# Patient Record
Sex: MALE | URBAN - METROPOLITAN AREA
[De-identification: names, ages, dates, MRNs, and addresses within clinical notes are randomized per-mention and may not be internally consistent; named-entity substitution may affect disease eponyms.]

---

## 2018-12-21 ENCOUNTER — TRANSFERRED RECORDS (OUTPATIENT)
Dept: HEALTH INFORMATION MANAGEMENT | Facility: CLINIC | Age: 73
End: 2018-12-21

## 2018-12-22 ENCOUNTER — HOSPITAL ENCOUNTER (INPATIENT)
Facility: CLINIC | Age: 73
LOS: 3 days | Discharge: NURSING, REHAB OR LONG-TERM CARE | DRG: 381 | End: 2018-12-25
Attending: INTERNAL MEDICINE | Admitting: INTERNAL MEDICINE
Payer: MEDICARE

## 2018-12-22 DIAGNOSIS — K92.0 HEMATEMESIS, PRESENCE OF NAUSEA NOT SPECIFIED: ICD-10-CM

## 2018-12-22 DIAGNOSIS — I48.0 PAROXYSMAL ATRIAL FIBRILLATION (H): Primary | ICD-10-CM

## 2018-12-22 LAB
ABO + RH BLD: NORMAL
ABO + RH BLD: NORMAL
ALBUMIN SERPL-MCNC: 3 G/DL (ref 3.4–5)
ALP SERPL-CCNC: 59 U/L (ref 40–150)
ALT SERPL W P-5'-P-CCNC: 17 U/L (ref 0–70)
ANION GAP SERPL CALCULATED.3IONS-SCNC: 7 MMOL/L (ref 3–14)
APTT PPP: 30 SEC (ref 22–37)
AST SERPL W P-5'-P-CCNC: 15 U/L (ref 0–45)
BASOPHILS # BLD AUTO: 0 10E9/L (ref 0–0.2)
BASOPHILS NFR BLD AUTO: 0.1 %
BILIRUB SERPL-MCNC: 0.6 MG/DL (ref 0.2–1.3)
BLD GP AB SCN SERPL QL: NORMAL
BLOOD BANK CMNT PATIENT-IMP: NORMAL
BUN SERPL-MCNC: 18 MG/DL (ref 7–30)
CALCIUM SERPL-MCNC: 7.6 MG/DL (ref 8.5–10.1)
CHLORIDE SERPL-SCNC: 110 MMOL/L (ref 94–109)
CO2 SERPL-SCNC: 24 MMOL/L (ref 20–32)
CREAT SERPL-MCNC: 0.78 MG/DL (ref 0.66–1.25)
DIFFERENTIAL METHOD BLD: ABNORMAL
EOSINOPHIL # BLD AUTO: 0 10E9/L (ref 0–0.7)
EOSINOPHIL NFR BLD AUTO: 0.1 %
ERYTHROCYTE [DISTWIDTH] IN BLOOD BY AUTOMATED COUNT: 12.9 % (ref 10–15)
GFR SERPL CREATININE-BSD FRML MDRD: >90 ML/MIN/{1.73_M2}
GLUCOSE SERPL-MCNC: 90 MG/DL (ref 70–99)
HCT VFR BLD AUTO: 39.8 % (ref 40–53)
HGB BLD-MCNC: 11.9 G/DL (ref 13.3–17.7)
HGB BLD-MCNC: 12.9 G/DL (ref 13.3–17.7)
IMM GRANULOCYTES # BLD: 0 10E9/L (ref 0–0.4)
IMM GRANULOCYTES NFR BLD: 0.2 %
INR PPP: 1.15 (ref 0.86–1.14)
LACTATE BLD-SCNC: 1.1 MMOL/L (ref 0.7–2)
LYMPHOCYTES # BLD AUTO: 0.8 10E9/L (ref 0.8–5.3)
LYMPHOCYTES NFR BLD AUTO: 8.7 %
MCH RBC QN AUTO: 30.8 PG (ref 26.5–33)
MCHC RBC AUTO-ENTMCNC: 32.4 G/DL (ref 31.5–36.5)
MCV RBC AUTO: 95 FL (ref 78–100)
MONOCYTES # BLD AUTO: 1 10E9/L (ref 0–1.3)
MONOCYTES NFR BLD AUTO: 10.4 %
NEUTROPHILS # BLD AUTO: 7.6 10E9/L (ref 1.6–8.3)
NEUTROPHILS NFR BLD AUTO: 80.5 %
NRBC # BLD AUTO: 0 10*3/UL
NRBC BLD AUTO-RTO: 0 /100
PLATELET # BLD AUTO: 135 10E9/L (ref 150–450)
POTASSIUM SERPL-SCNC: 4.2 MMOL/L (ref 3.4–5.3)
PROT SERPL-MCNC: 6 G/DL (ref 6.8–8.8)
RBC # BLD AUTO: 4.19 10E12/L (ref 4.4–5.9)
SODIUM SERPL-SCNC: 140 MMOL/L (ref 133–144)
SPECIMEN EXP DATE BLD: NORMAL
UPPER GI ENDOSCOPY: NORMAL
WBC # BLD AUTO: 9.4 10E9/L (ref 4–11)

## 2018-12-22 PROCEDURE — 86901 BLOOD TYPING SEROLOGIC RH(D): CPT | Performed by: INTERNAL MEDICINE

## 2018-12-22 PROCEDURE — 36415 COLL VENOUS BLD VENIPUNCTURE: CPT | Performed by: INTERNAL MEDICINE

## 2018-12-22 PROCEDURE — 85610 PROTHROMBIN TIME: CPT | Performed by: STUDENT IN AN ORGANIZED HEALTH CARE EDUCATION/TRAINING PROGRAM

## 2018-12-22 PROCEDURE — 40000141 ZZH STATISTIC PERIPHERAL IV START W/O US GUIDANCE

## 2018-12-22 PROCEDURE — 25000128 H RX IP 250 OP 636: Performed by: INTERNAL MEDICINE

## 2018-12-22 PROCEDURE — 86850 RBC ANTIBODY SCREEN: CPT | Performed by: INTERNAL MEDICINE

## 2018-12-22 PROCEDURE — G0500 MOD SEDAT ENDO SERVICE >5YRS: HCPCS | Performed by: INTERNAL MEDICINE

## 2018-12-22 PROCEDURE — 25000132 ZZH RX MED GY IP 250 OP 250 PS 637: Mod: GY | Performed by: STUDENT IN AN ORGANIZED HEALTH CARE EDUCATION/TRAINING PROGRAM

## 2018-12-22 PROCEDURE — 25000125 ZZHC RX 250: Performed by: INTERNAL MEDICINE

## 2018-12-22 PROCEDURE — 85025 COMPLETE CBC W/AUTO DIFF WBC: CPT | Performed by: STUDENT IN AN ORGANIZED HEALTH CARE EDUCATION/TRAINING PROGRAM

## 2018-12-22 PROCEDURE — 80053 COMPREHEN METABOLIC PANEL: CPT | Performed by: STUDENT IN AN ORGANIZED HEALTH CARE EDUCATION/TRAINING PROGRAM

## 2018-12-22 PROCEDURE — 93005 ELECTROCARDIOGRAM TRACING: CPT

## 2018-12-22 PROCEDURE — 85730 THROMBOPLASTIN TIME PARTIAL: CPT | Performed by: STUDENT IN AN ORGANIZED HEALTH CARE EDUCATION/TRAINING PROGRAM

## 2018-12-22 PROCEDURE — 88305 TISSUE EXAM BY PATHOLOGIST: CPT | Performed by: INTERNAL MEDICINE

## 2018-12-22 PROCEDURE — 85018 HEMOGLOBIN: CPT | Performed by: INTERNAL MEDICINE

## 2018-12-22 PROCEDURE — A9270 NON-COVERED ITEM OR SERVICE: HCPCS | Mod: GY | Performed by: STUDENT IN AN ORGANIZED HEALTH CARE EDUCATION/TRAINING PROGRAM

## 2018-12-22 PROCEDURE — 43239 EGD BIOPSY SINGLE/MULTIPLE: CPT | Performed by: INTERNAL MEDICINE

## 2018-12-22 PROCEDURE — 0DB68ZX EXCISION OF STOMACH, VIA NATURAL OR ARTIFICIAL OPENING ENDOSCOPIC, DIAGNOSTIC: ICD-10-PCS | Performed by: INTERNAL MEDICINE

## 2018-12-22 PROCEDURE — 25000132 ZZH RX MED GY IP 250 OP 250 PS 637: Performed by: INTERNAL MEDICINE

## 2018-12-22 PROCEDURE — 86900 BLOOD TYPING SEROLOGIC ABO: CPT | Performed by: INTERNAL MEDICINE

## 2018-12-22 PROCEDURE — C9113 INJ PANTOPRAZOLE SODIUM, VIA: HCPCS | Performed by: STUDENT IN AN ORGANIZED HEALTH CARE EDUCATION/TRAINING PROGRAM

## 2018-12-22 PROCEDURE — 36415 COLL VENOUS BLD VENIPUNCTURE: CPT | Performed by: STUDENT IN AN ORGANIZED HEALTH CARE EDUCATION/TRAINING PROGRAM

## 2018-12-22 PROCEDURE — 99222 1ST HOSP IP/OBS MODERATE 55: CPT | Mod: AI | Performed by: INTERNAL MEDICINE

## 2018-12-22 PROCEDURE — 0DB78ZX EXCISION OF STOMACH, PYLORUS, VIA NATURAL OR ARTIFICIAL OPENING ENDOSCOPIC, DIAGNOSTIC: ICD-10-PCS | Performed by: INTERNAL MEDICINE

## 2018-12-22 PROCEDURE — 12000008 ZZH R&B INTERMEDIATE UMMC

## 2018-12-22 PROCEDURE — 83605 ASSAY OF LACTIC ACID: CPT | Performed by: INTERNAL MEDICINE

## 2018-12-22 PROCEDURE — 25000128 H RX IP 250 OP 636: Performed by: STUDENT IN AN ORGANIZED HEALTH CARE EDUCATION/TRAINING PROGRAM

## 2018-12-22 PROCEDURE — 93010 ELECTROCARDIOGRAM REPORT: CPT | Performed by: INTERNAL MEDICINE

## 2018-12-22 RX ORDER — SIMETHICONE
LIQUID (ML) MISCELLANEOUS PRN
Status: DISCONTINUED | OUTPATIENT
Start: 2018-12-22 | End: 2018-12-25 | Stop reason: HOSPADM

## 2018-12-22 RX ORDER — FENTANYL CITRATE 50 UG/ML
INJECTION, SOLUTION INTRAMUSCULAR; INTRAVENOUS PRN
Status: DISCONTINUED | OUTPATIENT
Start: 2018-12-22 | End: 2018-12-23

## 2018-12-22 RX ORDER — SODIUM CHLORIDE 9 MG/ML
INJECTION, SOLUTION INTRAVENOUS CONTINUOUS
Status: DISCONTINUED | OUTPATIENT
Start: 2018-12-22 | End: 2018-12-22

## 2018-12-22 RX ORDER — NALOXONE HYDROCHLORIDE 0.4 MG/ML
.1-.4 INJECTION, SOLUTION INTRAMUSCULAR; INTRAVENOUS; SUBCUTANEOUS
Status: DISCONTINUED | OUTPATIENT
Start: 2018-12-22 | End: 2018-12-25 | Stop reason: HOSPADM

## 2018-12-22 RX ORDER — METOPROLOL TARTRATE 1 MG/ML
5 INJECTION, SOLUTION INTRAVENOUS EVERY 5 MIN PRN
Status: DISCONTINUED | OUTPATIENT
Start: 2018-12-22 | End: 2018-12-22

## 2018-12-22 RX ORDER — ONDANSETRON 2 MG/ML
4 INJECTION INTRAMUSCULAR; INTRAVENOUS EVERY 6 HOURS PRN
Status: DISCONTINUED | OUTPATIENT
Start: 2018-12-22 | End: 2018-12-25 | Stop reason: HOSPADM

## 2018-12-22 RX ORDER — PANTOPRAZOLE SODIUM 40 MG/1
40 TABLET, DELAYED RELEASE ORAL
Status: DISCONTINUED | OUTPATIENT
Start: 2018-12-22 | End: 2018-12-25 | Stop reason: HOSPADM

## 2018-12-22 RX ORDER — ONDANSETRON 4 MG/1
4 TABLET, ORALLY DISINTEGRATING ORAL EVERY 6 HOURS PRN
Status: DISCONTINUED | OUTPATIENT
Start: 2018-12-22 | End: 2018-12-25 | Stop reason: HOSPADM

## 2018-12-22 RX ORDER — PANTOPRAZOLE SODIUM 40 MG/1
40 TABLET, DELAYED RELEASE ORAL
Status: DISCONTINUED | OUTPATIENT
Start: 2018-12-23 | End: 2018-12-22

## 2018-12-22 RX ADMIN — PANTOPRAZOLE SODIUM 40 MG: 40 TABLET, DELAYED RELEASE ORAL at 17:32

## 2018-12-22 RX ADMIN — SODIUM CHLORIDE, POTASSIUM CHLORIDE, SODIUM LACTATE AND CALCIUM CHLORIDE 500 ML: 600; 310; 30; 20 INJECTION, SOLUTION INTRAVENOUS at 13:25

## 2018-12-22 RX ADMIN — SODIUM CHLORIDE: 9 INJECTION, SOLUTION INTRAVENOUS at 10:17

## 2018-12-22 RX ADMIN — SODIUM CHLORIDE: 9 INJECTION, SOLUTION INTRAVENOUS at 06:06

## 2018-12-22 RX ADMIN — METOPROLOL TARTRATE 5 MG: 1 INJECTION, SOLUTION INTRAVENOUS at 14:31

## 2018-12-22 RX ADMIN — Medication 12.5 MG: at 17:31

## 2018-12-22 RX ADMIN — SODIUM CHLORIDE 8 MG/HR: 9 INJECTION, SOLUTION INTRAVENOUS at 10:18

## 2018-12-22 ASSESSMENT — ACTIVITIES OF DAILY LIVING (ADL)
ADLS_ACUITY_SCORE: 28
ADLS_ACUITY_SCORE: 19

## 2018-12-22 NOTE — OR NURSING
EGD under conscious sedation.  Upon MD coming in room, Pt went into a fib or SVT rates 130-150, BP tolerating heart rate.  Dr Adam elected to proceed with procedure.  As soon as we repositioned pt, heart rate went back to sinus rhythm with rates in the 70's/80's. Biopsies obtained.  Esophageal erosion noted by MD, no active bleeding observed.  At end of procedure, pt went back into afib/SVT with rates 140's-150's, bp stable.  Report called to Gricelda at 1227 hrs with update on change in heart rhythm.

## 2018-12-22 NOTE — PLAN OF CARE
Pt transferred from [place] ED for GIB evaluation. Arrived to  at 0430. Pt is deaf and blind. Bed alarm on for safety. VSS on RA. PIV infusing NS at 100ml/hr. Pt has family in the [facility] that will visit today. The [facility] Superviser and his emergency contact numbers are in the sticky notes. No nausea or non-verbal signs of pain. No emesis or BM since arrived. Tele NSR. Someone from his group home or his family will be here today. Will continue to monitor and follow POC.

## 2018-12-22 NOTE — PROCEDURES
Brief procedure note    EGD performed with conscious sedation in endoscopy suite.  Consent obtained from patient's guardian.    Patient developed AF with RVR spontaneously converting to sinus rhythm.    Esophagus- erosive esophagitis with ulceration in distal esophagus- no active bleeding    Stomach- atrophic mucosa in body of stomach- biopsied; no bleeding or ulcer    Duodenum- normal; no bleeding or ulcer    Assessment  Melena secondary to erosive esophagitis.  No active bleeding.    Recommend  1.  Return to floor  2.  1L NS bolus- running  3.  Obtain home medications  4.  Advance diet as tolerated  5.  Change PPI infusion to PPI PO BID for 3 months  6.  Follow-up pending biopsies    Please call GI fellow on call with questions    Carlo Adam MD  Hepatology  [facility]

## 2018-12-22 NOTE — CONSULTS
This note is written to satisfy the electronic consult order. Please review the note written by our service earlier for recommendations.    Lui Rubio  Gastroenterology Fellow  P [phone]

## 2018-12-22 NOTE — CONSULTS
GASTROENTEROLOGY CONSULTATION      Date of Admission:  12/22/2018           Reason for Consultation:   We were asked by Rosemary Bishop of the medicine team to evaluate this patient with melena and coffee ground emesis.           ASSESSMENT AND RECOMMENDATIONS:   Assessment:  73 year old male who has been legally blind and deaf for many years and has a hx of urinary incontinence who was admitted under the medicine service as a transfer from an outside hospital for 1 day history of coffee ground emesis and melenic stools.   Patient is hemodynamically stable. Hb baseline is 13-14,  14.1 on admission and down-trended to 12.9.   Tommy Blatchford Score - 1  AIMS65 - 0    S/p EGD 12/22/18 - Erosive esophagitis with ulceration in the distal esophagus with no active bleeding. Atrophic mucosa in the body of the stomach which was biopsied. Duodenum was normal.     Recommendations  - PPI can be switched to PO PPI BID twice daily which should be continued for a total of 3 months.   - IV fluid resuscitation as per the primary team.  - Advance diet as tolerated.  - Continue other home medications.  - We will follow up on the biopsies.     Gastroenterology outpatient follow up recommendations: We will continue to follow with you.  Thank you for involving us in this patient's care. Please do not hesitate to contact the GI service with any questions or concerns.     Pt care plan discussed with Dr. Pak and Dr. Adam, GI staff physician.    Lui Rubio  -------------------------------------------------------------------------------------------------------------------           History of Present Illness:   Agapito Mullen is a 73 year old male who has been legally blind and deaf for many years and has a hx of urinary incontinence who was admitted under the medicine service as a transfer from an outside hospital for 1 day history of coffee ground emesis and melenic stools.     The history has been obtained from the chart  since the patient can not communicate. Patient initially presented from his group home after reportedly having multiple episodes of coffee-ground emesis earlier in the day. He also reportedly had dark, tarry stools that started after presenting to the hospital.     No prior hx of GI bleeding. No hx of NSAID use.  No history of liver disease. No reported history of weight loss or fevers/chills/rigors. On admission patient was hypertensive and hemodynamically stable. Labs revealed a Hb with a baseline 13-14,  14.1 on admission and down-trended to 12.9. BMP was unremarkable. INR was normal.             Past Medical History:   Reviewed and edited as appropriate  Legally Blind and Deaf  Urinary incontinence         Past Surgical History:   Reviewed and edited as appropriate   Cholecystectomy with repair of duodenal Fistula  ERCP in 2014  Hemorrhoudectomy         Previous Endoscopy:   No results found for this or any previous visit.         Social History:   Reviewed and edited as appropriate  Legally blind and deaf. Lives in a group home.         Family History:   Reviewed and edited as appropriate  Unable to be identified.  Not known history of colorectal cancer, liver disease, or inflammatory bowel disease.       Allergies:   Reviewed and edited as appropriate   Can not be reviewed         Medications:     Current Facility-Administered Medications   Medication     fentaNYL (PF) (SUBLIMAZE) injection     melatonin tablet 1 mg     metoprolol (LOPRESSOR) injection 5 mg     midazolam (VERSED) injection     naloxone (NARCAN) injection 0.1-0.4 mg     ondansetron (ZOFRAN-ODT) ODT tab 4 mg    Or     ondansetron (ZOFRAN) injection 4 mg     pantoprazole (PROTONIX) 80 mg in sodium chloride 0.9 % 100 mL infusion     simethicone (MYLICON) suspension             Review of Systems:   A complete review of systems was performed and is negative except as noted in the HPI           Physical Exam:   /66 (BP Location: Left arm)    Pulse 134   Temp 99.4  F (37.4  C) (Oral)   Resp 22   SpO2 94%   Wt:   Wt Readings from Last 2 Encounters:   No data found for Wt      Constitutional: Legally deaf and blind. Non-conversant  Eyes: Sclera anicteric/injected  Ears/nose/mouth/throat: Normal oropharynx without ulcers or exudate  CV: No edema  Respiratory: Unlabored breathing  Lymph: No axillary, submandibular, supraclavicular or inguinal lymphadenopathy  Abd: Nondistended, +bs, no hepatosplenomegaly, nontender, no peritoneal signs. JACOB with dark brown stools.  Skin: warm, perfused, no jaundice  Neuro: Unable to assess  Psych: Unable to assess  MSK: No gross deformities         Data:   Labs and imaging below were independently reviewed and interpreted    BMP  Recent Labs   Lab 12/22/18  0710      POTASSIUM 4.2   CHLORIDE 110*   HEMANT 7.6*   CO2 24   BUN 18   CR 0.78   GLC 90     CBC  Recent Labs   Lab 12/22/18  1334 12/22/18  0710   WBC  --  9.4   RBC  --  4.19*   HGB 11.9* 12.9*   HCT  --  39.8*   MCV  --  95   MCH  --  30.8   MCHC  --  32.4   RDW  --  12.9   PLT  --  135*     INR  Recent Labs   Lab 12/22/18  0710   INR 1.15*     LFTs  Recent Labs   Lab 12/22/18  0710   ALKPHOS 59   AST 15   ALT 17   BILITOTAL 0.6   PROTTOTAL 6.0*   ALBUMIN 3.0*      PANCNo lab results found in last 7 days.    Imaging

## 2018-12-22 NOTE — H&P
[facility]    History and Physical - [place] 1 Service        Date of Admission:  12/22/2018    Assessment & Plan   Agapito Mullen is a 73 year old male with known blindness and deafness but no known history of GI disease who presents with one day of recurrent coffee ground emesis and dark black diarrhea.    UGI Bleed:  New onset bleeding without known history of ulcerative or liver disease, normal coags and platelets, no sign of other mucosal bleeding.  BUN without normal limits, no obvious sign of abdominal pain.  Hgb appropriate at outside facility but would be valuable to follow-up if to determine if bleeding is persistent.  - Repeat CBC/coags  - Continue pantoprazole gtt  - Consult GI in AM for EGD      Social:  Patient's family lives in the [facility] and should be visiting this morning. Additionally, a representative from the group home will be in today to answer further questions about the medical history, including of this acute event.    Diet: NPO for Medical/Clinical Reasons Except for: No Exceptions    Fluids: NS while NPO  DVT Prophylaxis: Pneumatic Compression Devices (ongoing bleeding)  Schwartz Catheter: not present  Code Status: Full until otherwise determined, will need to follow-up with family.    Disposition Plan   Expected discharge: 2 - 3 days, recommended to prior living arrangement once hemoglobin stable and safe disposition plan/ TCU bed available.  Entered: Clark Pascual MD 12/22/2018, 6:48 AM       Patient to be staffed in the AM after further discussion with family (who live in the [facility]).    Clark Pascual MD  [facility] Service  [facility]  Pager: [phone]  Please see sticky note for cross cover information  ______________________________________________________________________    Chief Complaint   Hematemesis    History is obtained from the electronic health record    History of Present Illness    Agapito Mullen is a 73 year old male with substantial vision and hearing loss who presented to an outside hospital today with coffee-ground emesis and dark black diarrhea.  History is somewhat limited by the patient's inability to communicate, in the absence of there are medical records.  Patient reportedly presented from his group home after multiple episodes of coffee-ground emesis earlier in the day. He also reportedly had dark black diarrhea started on the morning of admission, that had resolved by noon.  Per  who discussed the patient with outside ED docter, he has no history of GI bleeds and no known ulcerative disease.  Hepatic history unknown.  On presentation he was hypertensive with a systolic 176, but normal heart rate.  Hemoglobin at that facility was 14.1, with a normal lactate, and unremarkable BMP.  Hemoccult positive.  Normal INR.  He was placed on telemetry which largely showed sinus arrhythmia, but he had a least 2 episodest of SVT which self resolved with the patient holding his breath, unprompted.      Review of Systems    Review of systems not obtained due to patient factors - patient's limited capacity to interact.    Past Medical History    Unable to be determined fully  - hx of urinary incontinence and UTI    Past Surgical History   Unable to be determined fully  - cholecystectomy with repair of duodenal fisula  - ERCP (2014)   - Hemorrhoidectomy    Social History   Lives in a group home.    Family History   Unable to aquire    Prior to Admission Medications   None     Allergies   Not on File    Physical Exam   Vital Signs: Temp: 98.6  F (37  C) Temp src: Axillary BP: 122/53 Pulse: 80   Resp: 17 SpO2: 98 % O2 Device: None (Room air)    Weight: 0 lbs 0 oz    General Appearance: Well appearing, resting comfortably in bed  HEENT: Eyes closed, nares patent without crusting or bleeding. Oropharynx moist without lesions.   Respiratory: Breathing comfortably on RA,  CTAB  Cardiovascular: Normal rhythm without discernable arrhythmia  GI: Soft, non-tender, non-distended  Skin: No abnormal bleeding or bruising  Neurologic: Arousable, unable to verbalize, baseline deaf and blind.  Moving all extremities symmetrically    Data    Data reviewed today: I reviewed all medications, new labs and imaging results over the last 24 hours. I personally reviewed:     EKG on presentation: Sinus rhythm with sinus arrhythmia, right atrial enlargement     EKG during SVT: Supraventricular tachycardia  ST and T wave abnormality, consider inferolateral ischemia ST and T wave inversion now evident in Inferolateral leads      Recent Labs   Lab 12/22/18  0710   WBC 9.4   HGB 12.9*   MCV 95   *      POTASSIUM 4.2   CHLORIDE 110*   CO2 24   BUN 18   CR 0.78   ANIONGAP 7   HEMANT 7.6*   GLC 90   ALBUMIN 3.0*   PROTTOTAL 6.0*   BILITOTAL 0.6   ALKPHOS 59   ALT 17   AST 15

## 2018-12-23 ENCOUNTER — TRANSFERRED RECORDS (OUTPATIENT)
Dept: HEALTH INFORMATION MANAGEMENT | Facility: CLINIC | Age: 73
End: 2018-12-23

## 2018-12-23 LAB
ANION GAP SERPL CALCULATED.3IONS-SCNC: 7 MMOL/L (ref 3–14)
BUN SERPL-MCNC: 12 MG/DL (ref 7–30)
CALCIUM SERPL-MCNC: 8.3 MG/DL (ref 8.5–10.1)
CHLORIDE SERPL-SCNC: 108 MMOL/L (ref 94–109)
CO2 SERPL-SCNC: 25 MMOL/L (ref 20–32)
CREAT SERPL-MCNC: 0.77 MG/DL (ref 0.66–1.25)
ERYTHROCYTE [DISTWIDTH] IN BLOOD BY AUTOMATED COUNT: 13.2 % (ref 10–15)
GFR SERPL CREATININE-BSD FRML MDRD: >90 ML/MIN/{1.73_M2}
GLUCOSE SERPL-MCNC: 84 MG/DL (ref 70–99)
HCT VFR BLD AUTO: 36.4 % (ref 40–53)
HGB BLD-MCNC: 11.6 G/DL (ref 13.3–17.7)
MAGNESIUM SERPL-MCNC: 1.9 MG/DL (ref 1.6–2.3)
MCH RBC QN AUTO: 30.6 PG (ref 26.5–33)
MCHC RBC AUTO-ENTMCNC: 31.9 G/DL (ref 31.5–36.5)
MCV RBC AUTO: 96 FL (ref 78–100)
PLATELET # BLD AUTO: 129 10E9/L (ref 150–450)
POTASSIUM SERPL-SCNC: 4 MMOL/L (ref 3.4–5.3)
RBC # BLD AUTO: 3.79 10E12/L (ref 4.4–5.9)
SODIUM SERPL-SCNC: 139 MMOL/L (ref 133–144)
WBC # BLD AUTO: 7.8 10E9/L (ref 4–11)

## 2018-12-23 PROCEDURE — 25000132 ZZH RX MED GY IP 250 OP 250 PS 637: Mod: GY | Performed by: INTERNAL MEDICINE

## 2018-12-23 PROCEDURE — 80048 BASIC METABOLIC PNL TOTAL CA: CPT | Performed by: STUDENT IN AN ORGANIZED HEALTH CARE EDUCATION/TRAINING PROGRAM

## 2018-12-23 PROCEDURE — 12000008 ZZH R&B INTERMEDIATE UMMC

## 2018-12-23 PROCEDURE — A9270 NON-COVERED ITEM OR SERVICE: HCPCS | Mod: GY | Performed by: STUDENT IN AN ORGANIZED HEALTH CARE EDUCATION/TRAINING PROGRAM

## 2018-12-23 PROCEDURE — 36415 COLL VENOUS BLD VENIPUNCTURE: CPT | Performed by: STUDENT IN AN ORGANIZED HEALTH CARE EDUCATION/TRAINING PROGRAM

## 2018-12-23 PROCEDURE — 25000132 ZZH RX MED GY IP 250 OP 250 PS 637: Mod: GY | Performed by: STUDENT IN AN ORGANIZED HEALTH CARE EDUCATION/TRAINING PROGRAM

## 2018-12-23 PROCEDURE — 99232 SBSQ HOSP IP/OBS MODERATE 35: CPT | Mod: GC | Performed by: INTERNAL MEDICINE

## 2018-12-23 PROCEDURE — 83735 ASSAY OF MAGNESIUM: CPT | Performed by: STUDENT IN AN ORGANIZED HEALTH CARE EDUCATION/TRAINING PROGRAM

## 2018-12-23 PROCEDURE — 85027 COMPLETE CBC AUTOMATED: CPT | Performed by: STUDENT IN AN ORGANIZED HEALTH CARE EDUCATION/TRAINING PROGRAM

## 2018-12-23 RX ORDER — METOPROLOL TARTRATE 25 MG/1
12.5 TABLET, FILM COATED ORAL 2 TIMES DAILY
Qty: 30 TABLET | Refills: 0 | Status: SHIPPED | OUTPATIENT
Start: 2018-12-23 | End: 2019-01-22

## 2018-12-23 RX ORDER — PANTOPRAZOLE SODIUM 40 MG/1
40 TABLET, DELAYED RELEASE ORAL
Qty: 60 TABLET | Refills: 0 | Status: SHIPPED | OUTPATIENT
Start: 2018-12-23 | End: 2018-12-25

## 2018-12-23 RX ADMIN — Medication 12.5 MG: at 09:03

## 2018-12-23 RX ADMIN — PANTOPRAZOLE SODIUM 40 MG: 40 TABLET, DELAYED RELEASE ORAL at 09:03

## 2018-12-23 RX ADMIN — PANTOPRAZOLE SODIUM 40 MG: 40 TABLET, DELAYED RELEASE ORAL at 18:45

## 2018-12-23 RX ADMIN — Medication 12.5 MG: at 19:01

## 2018-12-23 ASSESSMENT — ACTIVITIES OF DAILY LIVING (ADL)
ADLS_ACUITY_SCORE: 28

## 2018-12-23 NOTE — PROGRESS NOTES
[facility], [place]    Progress Note - [place] 1 Service        Date of Admission:  12/22/2018    Plan for today:  - Monitor BP and HR on 12.5 mg metop tartrate   - PPI BID for 3 months   - Discharge to prior living arrangement tomorrow    Assessment & Plan   Agapito Mullen is a 73 year old male with known blindness and deafness but no known history of GI disease who presents with one day of recurrent coffee ground emesis and dark black diarrhea.     # UGI Bleed  New onset bleeding without known history of ulcerative or liver disease, normal coags and platelets, no sign of other mucosal bleeding.  BUN without normal limits, no obvious sign of abdominal pain.  Hgb appropriate at outside facility but would be valuable to follow-up if to determine if bleeding is persistent. EGD yesterday showed non-bleeding esophageal ulcers and esophagitis, as well as gastric mucosal atrophy. Hgb 11.6 today.   - CBC in the AM  - PO pantoprazole BID for 3 months  - avoid NSAIDs  - Biopsy results pending     # A fib with RVR-- resolved  Patient went into a fib with RVR while getting EGD yesterday. HR in the 140s, BPs in the 90s/50s. Received IV metoprolol 5 mg x1, then 12.5 mg of metop tartrate with good resolution to HR in the 70s. [id number] of 1 (due to age) and HAS BLED of 1 (due to age). Risk of bleeding is 3.4% compared to 1.3% risk of stroke. Will not anticoagulate   - Continue PO metoprolol tartrate 12.5 mg BID   - He will need to follow up with his primary care provider about long term use of metoprolol for a fib      Diet: Advance Diet as Tolerated: Regular Diet Adult; Regular Diet Adult    Fluids: None  Lines: PIV  DVT Prophylaxis: Pneumatic Compression Devices  Schwartz Catheter: not present  Code Status: Full     Disposition Plan   Expected discharge: Tomorrow, recommended to prior living arrangement once hemoglobin stable.  Entered: Debbie Thapa MD 12/23/2018, 10:19 AM       The  patient's care was discussed with the Attending Physician, Dr. Heart.    MD [facility] [place] 1 Service  [facility] [place]  Pager: [phone]  Please see sticky note for cross cover information  ______________________________________________________________________    Interval History   No acute events overnight. Patient unable to give communicate whether he is in pain or if he has any complaints this AM due to deafness and blindness.     Data reviewed today: I reviewed all medications, new labs and imaging results over the last 24 hours.     Physical Exam   Vital Signs: Temp: 97.6  F (36.4  C) Temp src: Axillary BP: 98/55 Pulse: 134 Heart Rate: 67 Resp: 18 SpO2: 98 % O2 Device: None (Room air)    Weight: 0 lbs 0 oz  Physical Exam   Constitutional: No distress.   Sleeping in bed in NAD   HENT:   Head: Normocephalic and atraumatic.   Eyes:   Eyes closed throughout exam    Cardiovascular: Normal rate, regular rhythm, normal heart sounds and intact distal pulses.   Pulmonary/Chest: Effort normal and breath sounds normal. No respiratory distress. He has no wheezes.   Abdominal: Soft. Bowel sounds are normal. He exhibits no distension. There is no tenderness. There is no guarding.   Musculoskeletal: He exhibits no edema or tenderness.   Neurological:   Blind and deaf. He had some muttering, but not comprehendible.    Skin: He is not diaphoretic.       Data   Recent Labs   Lab 12/23/18  0727 12/22/18  1334 12/22/18  0710   WBC 7.8  --  9.4   HGB 11.6* 11.9* 12.9*   MCV 96  --  95   *  --  135*   INR  --   --  1.15*     --  140   POTASSIUM 4.0  --  4.2   CHLORIDE 108  --  110*   CO2 25  --  24   BUN 12 --  18   CR 0.77  --  0.78   ANIONGAP 7  --  7   HEMANT 8.3*  --  7.6*   GLC 84  --  90   ALBUMIN  --   --  3.0*   PROTTOTAL  --   --  6.0*   BILITOTAL  --   --  0.6   ALKPHOS  --   --  59   ALT  --   --  17   AST  --   --  15

## 2018-12-23 NOTE — PLAN OF CARE
/44 (BP Location: Left arm)   Pulse 134   Temp 98.6  F (37  C) (Axillary)   Resp 20   SpO2 94%     Time: 7931-2185     Reason for admission: GIB   Vitals: VSS on RA ex elevated HR  Activity: SBA  Pain: No non-verbal indicators of pain  Neuro: alert and arousable to touch, blind, deaf and history intellectual impairment. Does not call appropriately, alarms active and audible. OLIVIA other neuros but seems consistent with baseline per  staff  Cardiac: Afib with RVR developed in mid morning and confirmed with EKG, now AFib in 60-80s  Respiratory: WDL  GI/: WDL, toileting offered every 2-3 hours  Diet: ADAT- Full liquid  Lines: R PIV with no-no  Skin/Wounds: WDL ex pale  Labs: Hgb=12.9, 11.9. Lactic=1.1.     New changes this shift: Pt deaf and blind. Admission completed with  staff assistance. Went for EGD this AM and went into Afib with RVR. Able to complete EGD and results of esophageal erosion, no active bleed at this time and biopsies taken. Afib with RVR confirmed with EKG and x1 IV Metoprolol given with some improvement and started on scheduled PO metoprolol, now Afib in 50s-60s. Triggered septic protocol with soft BPs and elevated HR, LR 500mL bolus and lactic 1.1. ADAT, clears tolerated and advanced to fulls. Updated  RN Hayde, pt's niece Aysha, pt's nephew and great nieces, and Guardian Tracy of POC today.     FYI per  staff, pt will yell if hungry and can do most ADLs if handed the toothbrush, spoon, etc.      Plan: Continue to ADAT and monitor s/s bleed. Potential discharge 1-2 days pending continued stability.       Continue to monitor and follow POC

## 2018-12-23 NOTE — PLAN OF CARE
/60 (BP Location: Left arm)   Pulse 134   Temp 98.3  F (36.8  C) (Axillary)   Resp 18   SpO2 97%     Time: 4228-5072     Reason for admission: GIB   Vitals: VSS on RA, telemetry in place. NSR this shift.  Activity: SBA  Pain: No non-verbal indicators of pain  Neuro: alert and arousable to touch, blind, deaf and history of intellectual impairment. Does not call appropriately. Bed alarm on for safety.   Cardiac: NSR on telemetry monitoring.   Respiratory: WDL  GI/: WDL, toileting offered every 2-3 hours  Diet: ADAT- Full liquid. Will take all PO offered.  Lines: R PIV is saline locked.  Skin/Wounds: WDL   New changes this shift: Pt moved to single room. Pt very verbal and loud at times. Offered food, fluids, and toileting frequently. Pt awake sitting on edge of bed at times. Monitored for safety. VSS. Pt resting intermittently. Will continue to assess per POC.

## 2018-12-23 NOTE — PLAN OF CARE
/65 (BP Location: Left arm)   Pulse 62   Temp 97.5  F (36.4  C) (Axillary)   Resp 18   SpO2 99%     Time: 8848-0046     Reason for admission: GIB   Vitals: VSS on RA ex elevated HR intermittently  Activity: SBA  Pain: No non-verbal indicators of pain  Neuro: alert and arousable to touch, blind, deaf and history intellectual impairment. Does not call appropriately, alarms active and audible. OLIVIA other neuros but seems consistent with baseline per  staff. Will yell/scream when has needs (food, toilet, up in chair)  Cardiac: Afib with intermittent RVR (not sustaining)  Respiratory: WDL  GI/: WDL, toileting offered every 2-3 hours. Small black BM today  Diet: ADAT- Regular with good appetite  Lines: R PIV with no-no  Skin/Wounds: WDL ex pale  Labs: Hgb=11.6    New changes this shift: Pt more alert, greater appetite and activity level today. RN Coordinator updated and pt's jaciel Garcia. Adjusted scheduled Metoprolol for HR control.      Plan: Potential discharge tomorrow pending continued stability. Tentative plan to go to family's house as planned for [place] instead of straight back to [place], discussed logistics with  MONIQUE Lock and Jaciel Garcia.      Continue to monitor and follow POC

## 2018-12-23 NOTE — PROGRESS NOTES
Brief progress note:    Briefly, this is a patient with blindness and deafness since birth who comes to the hospital due to coffee ground emesis and black diarrhea. He has no PMH. I spoke to his family, who states that he takes no meds at home except for a stool softener. He has never had an upper GI bleed before.     We agree with the day team's plan for likely upper GI bleed:  - NPO   - EGD  - PPI     During the EGD, patient developed a fib with RVR. He is now started on metoprolol tartrate 12.5 mg BID.   - Will need to monitor HR and BP     EGD showed esophagitis and atrophic mucosa in body of stomach.     Debbie Thapa MD  Internal Medicine, PGY-1  [phone]

## 2018-12-24 LAB
ERYTHROCYTE [DISTWIDTH] IN BLOOD BY AUTOMATED COUNT: 12.9 % (ref 10–15)
HCT VFR BLD AUTO: 35.9 % (ref 40–53)
HGB BLD-MCNC: 11.7 G/DL (ref 13.3–17.7)
INTERPRETATION ECG - MUSE: NORMAL
MCH RBC QN AUTO: 30.8 PG (ref 26.5–33)
MCHC RBC AUTO-ENTMCNC: 32.6 G/DL (ref 31.5–36.5)
MCV RBC AUTO: 95 FL (ref 78–100)
PLATELET # BLD AUTO: 142 10E9/L (ref 150–450)
RBC # BLD AUTO: 3.8 10E12/L (ref 4.4–5.9)
WBC # BLD AUTO: 8.3 10E9/L (ref 4–11)

## 2018-12-24 PROCEDURE — 36415 COLL VENOUS BLD VENIPUNCTURE: CPT | Performed by: STUDENT IN AN ORGANIZED HEALTH CARE EDUCATION/TRAINING PROGRAM

## 2018-12-24 PROCEDURE — A9270 NON-COVERED ITEM OR SERVICE: HCPCS | Mod: GY | Performed by: STUDENT IN AN ORGANIZED HEALTH CARE EDUCATION/TRAINING PROGRAM

## 2018-12-24 PROCEDURE — 25000132 ZZH RX MED GY IP 250 OP 250 PS 637: Mod: GY | Performed by: INTERNAL MEDICINE

## 2018-12-24 PROCEDURE — 12000008 ZZH R&B INTERMEDIATE UMMC

## 2018-12-24 PROCEDURE — 85027 COMPLETE CBC AUTOMATED: CPT | Performed by: STUDENT IN AN ORGANIZED HEALTH CARE EDUCATION/TRAINING PROGRAM

## 2018-12-24 PROCEDURE — 99232 SBSQ HOSP IP/OBS MODERATE 35: CPT | Mod: GC | Performed by: INTERNAL MEDICINE

## 2018-12-24 PROCEDURE — 25000132 ZZH RX MED GY IP 250 OP 250 PS 637: Mod: GY | Performed by: STUDENT IN AN ORGANIZED HEALTH CARE EDUCATION/TRAINING PROGRAM

## 2018-12-24 RX ADMIN — PANTOPRAZOLE SODIUM 40 MG: 40 TABLET, DELAYED RELEASE ORAL at 16:20

## 2018-12-24 RX ADMIN — Medication 12.5 MG: at 08:59

## 2018-12-24 RX ADMIN — PANTOPRAZOLE SODIUM 40 MG: 40 TABLET, DELAYED RELEASE ORAL at 08:58

## 2018-12-24 RX ADMIN — Medication 12.5 MG: at 21:20

## 2018-12-24 ASSESSMENT — ACTIVITIES OF DAILY LIVING (ADL)
ADLS_ACUITY_SCORE: 30

## 2018-12-24 NOTE — PLAN OF CARE
/54 (BP Location: Left arm)   Pulse 72   Temp 97.7  F (36.5  C) (Axillary)   Resp 18   SpO2 98%     VSS. SBA with bed or chair alarm at all times! No indications of pain. AO to touch (blind/deaf and hx of intellectual impairment). Doesn't call appropriately. Toileting q2-3 hrs. Voiding WNL, last BM 12/23 night, dark/tarry stool MD aware. Fair appetite. Meals set up with supervision guide hands around food and pt can feed self. R PIV SL. Verbally loud at times. Monitor for safety. No BM this shift. Cont to monitor.

## 2018-12-24 NOTE — PROGRESS NOTES
[facility], [place]    Progress Note - Bang 1 Service        Date of Admission:  12/22/2018    Changes for today:  - Monitor color of stool     Assessment & Plan   Agapito Mullen is a 73 year old male with known blindness and deafness but no known history of GI disease who presents with one day of recurrent coffee ground emesis and dark black diarrhea.     # UGI Bleed  New onset bleeding without known history of ulcerative or liver disease, normal coags and platelets, no sign of other mucosal bleeding.  BUN without normal limits, no obvious sign of abdominal pain.  Hgb appropriate at outside facility but would be valuable to follow-up if to determine if bleeding is persistent. EGD showed non-bleeding esophageal ulcers and esophagitis, as well as gastric mucosal atrophy. Hgb stable at 11.7 today. However, nursing notes indicate that patient had a small black stool, concerning for bleed.   - Will keep overnight and monitor color of stool   - CBC in the AM to monitor for blood loss.  - PO pantoprazole BID for 3 months  - Avoid NSAIDs  - Biopsy results pending      # A fib with RVR-- resolved  Patient went into a fib with RVR while getting EGD. HR in the 140s, BPs in the 90s/50s. Received IV metoprolol 5 mg x1, then 12.5 mg of metop tartrate with good resolution to HR in the 70s. He had another episode of a fib with RVR overnight with resolution after 12.5 mg of metoprolol tartrate was given. [id number] of 1 (due to age) and HAS BLED of 1 (due to age). Risk of bleeding is 3.4% compared to 1.3% risk of stroke. Will not anticoagulate   - Continue PO metoprolol tartrate 12.5 mg BID   - He will need to follow up with his primary care provider about long term use of metoprolol for a fib        Diet: Advance Diet as Tolerated: Regular Diet Adult; Regular Diet Adult  Room Service    Fluids: None  Lines: PIV  DVT Prophylaxis: Pneumatic Compression Devices  Schwartz Catheter: not present  Code  Status: Full Code      Disposition Plan   Expected discharge: Tomorrow, recommended to prior living arrangement once hemoglobin stable.  Entered: Debbie Thapa MD 12/24/2018, 1:39 PM       The patient's care was discussed with the Attending Physician, Dr. Heart.    MD Bang Munoz 1 Service  [facility], [place]  Pager: [phone]  Please see sticky note for cross cover information  ______________________________________________________________________    Interval History   Overnight events include patient going into a fib with RVR with HR in the 150s. Also had an episode of dark tarry stools per nursing notes. Patient blind and deaf and unable to give a history. He was sleeping this morning during physical exam.     Data reviewed today: I reviewed all medications, new labs and imaging results over the last 24 hours.     Physical Exam   Vital Signs: Temp: 97.6  F (36.4  C) Temp src: Axillary BP: 122/55 Pulse: 72 Heart Rate: 65 Resp: 18 SpO2: 97 % O2 Device: None (Room air)    Weight: 0 lbs 0 oz  Physical Exam   Constitutional: No distress.   Sleeping in bed in NAD   HENT:   Head: Normocephalic and atraumatic.   Eyes:   Eyes closed throughout exam    Cardiovascular: Normal rate, regular rhythm, normal heart sounds and intact distal pulses.   Pulmonary/Chest: Effort normal and breath sounds normal. No respiratory distress. He has no wheezes.   Abdominal: Soft. Bowel sounds are normal. He exhibits no distension. There is no tenderness. There is no guarding.   Musculoskeletal: He exhibits no edema or tenderness.   Neurological:   Blind and deaf.   Skin: He is not diaphoretic.     Data   Recent Labs   Lab 12/24/18  0659 12/23/18  0727 12/22/18  1334 12/22/18  0710   WBC 8.3 7.8  --  9.4   HGB 11.7* 11.6* 11.9* 12.9*   MCV 95 96  --  95   * 129*  --  135*   INR  --   --   --  1.15*   NA  --  139  --  140   POTASSIUM  --  4.0  --  4.2   CHLORIDE  --  108  --  110*    CO2  --  25  --  24   BUN  --  12  --  18   CR  --  0.77  --  0.78   ANIONGAP  --  7  --  7   HEMANT  --  8.3*  --  7.6*   GLC  --  84  --  90   ALBUMIN  --   --   --  3.0*   PROTTOTAL  --   --   --  6.0*   BILITOTAL  --   --   --  0.6   ALKPHOS  --   --   --  59   ALT  --   --   --  17   AST  --   --   --  15

## 2018-12-24 NOTE — PROVIDER NOTIFICATION
MD notified that pt had short episode of irregular bigeminy/trigeminy. HR in 70-80 range. Now regular rhythm. Pt vitals checked. Slightly elevated temp and BP. Will continue to assess.

## 2018-12-24 NOTE — PLAN OF CARE
/66 (BP Location: Left arm)   Pulse 78   Temp 99.3  F (37.4  C) (Axillary)   Resp 18   SpO2 96%     Time: 2642-3747     Reason for admission: GIB   Vitals: VS with elevated temp and bp, telemetry in place.  Activity: SBA, bed or chair alarm at all times.  Pain: No non-verbal indicators of pain  Neuro: alert and arousable to touch, blind, deaf and history of intellectual impairment. Does not call appropriately.   Cardiac: Irregular at times. Pt started Metoprolol. Episode of bigeminy/trigeminy overnight. MD notified. Mostly regular rate in 80-90s.  Respiratory: WDL  GI/: WDL, toileting offered every 2-3 hours  Diet: Tolerating regular diet, good appetite. Will take all PO offered. Set-up assist with supervision.   Lines: R PIV is saline locked.  Skin/Wounds: WDL   New changes this shift: Changes as noted above. Pt very verbal and loud at times. Offered food, fluids, and toileting frequently. Pt awake sitting on edge of bed at times. Monitored for safety. Pt resting intermittently. No active bleeding noted this shift. Will continue to assess per POC.

## 2018-12-24 NOTE — PROGRESS NOTES
Gastroenterology Inpatient Sign Off Note    Inpatient GI consults service will sign off. No further recommendations at this time. If primary team has addition questions, please page consult fellow listed in Rene. Hemoglobin stable.     Current GI Consult Staff: Dr. Pak     Follow up recommendations:   No outpatient GI clinic follow-up indicated. Follow-up with primary care provider at timing determined by discharge physician.    If outpatient GI follow-up is felt indicated by primary care, they may coordinate this by placing new GI referral and contacting  General GI clinic - ([phone])       Rd Powers  GI Fellow

## 2018-12-25 ENCOUNTER — PATIENT OUTREACH (OUTPATIENT)
Dept: CARE COORDINATION | Facility: CLINIC | Age: 73
End: 2018-12-25

## 2018-12-25 VITALS
WEIGHT: 166.7 LBS | OXYGEN SATURATION: 99 % | HEART RATE: 72 BPM | DIASTOLIC BLOOD PRESSURE: 60 MMHG | RESPIRATION RATE: 18 BRPM | TEMPERATURE: 97.4 F | SYSTOLIC BLOOD PRESSURE: 153 MMHG

## 2018-12-25 LAB
ERYTHROCYTE [DISTWIDTH] IN BLOOD BY AUTOMATED COUNT: 12.7 % (ref 10–15)
HCT VFR BLD AUTO: 36.8 % (ref 40–53)
HGB BLD-MCNC: 12 G/DL (ref 13.3–17.7)
MCH RBC QN AUTO: 30.6 PG (ref 26.5–33)
MCHC RBC AUTO-ENTMCNC: 32.6 G/DL (ref 31.5–36.5)
MCV RBC AUTO: 94 FL (ref 78–100)
PLATELET # BLD AUTO: 152 10E9/L (ref 150–450)
RBC # BLD AUTO: 3.92 10E12/L (ref 4.4–5.9)
WBC # BLD AUTO: 6.2 10E9/L (ref 4–11)

## 2018-12-25 PROCEDURE — A9270 NON-COVERED ITEM OR SERVICE: HCPCS | Mod: GY | Performed by: STUDENT IN AN ORGANIZED HEALTH CARE EDUCATION/TRAINING PROGRAM

## 2018-12-25 PROCEDURE — 99239 HOSP IP/OBS DSCHRG MGMT >30: CPT | Mod: GC | Performed by: HOSPITALIST

## 2018-12-25 PROCEDURE — 85027 COMPLETE CBC AUTOMATED: CPT | Performed by: STUDENT IN AN ORGANIZED HEALTH CARE EDUCATION/TRAINING PROGRAM

## 2018-12-25 PROCEDURE — 36415 COLL VENOUS BLD VENIPUNCTURE: CPT | Performed by: STUDENT IN AN ORGANIZED HEALTH CARE EDUCATION/TRAINING PROGRAM

## 2018-12-25 PROCEDURE — 25000132 ZZH RX MED GY IP 250 OP 250 PS 637: Mod: GY | Performed by: INTERNAL MEDICINE

## 2018-12-25 PROCEDURE — 25000132 ZZH RX MED GY IP 250 OP 250 PS 637: Mod: GY | Performed by: STUDENT IN AN ORGANIZED HEALTH CARE EDUCATION/TRAINING PROGRAM

## 2018-12-25 RX ORDER — PANTOPRAZOLE SODIUM 40 MG/1
40 TABLET, DELAYED RELEASE ORAL
Qty: 60 TABLET | Refills: 0 | Status: SHIPPED | OUTPATIENT
Start: 2018-12-25 | End: 2019-01-24

## 2018-12-25 RX ADMIN — PANTOPRAZOLE SODIUM 40 MG: 40 TABLET, DELAYED RELEASE ORAL at 09:49

## 2018-12-25 RX ADMIN — Medication 12.5 MG: at 09:49

## 2018-12-25 ASSESSMENT — ACTIVITIES OF DAILY LIVING (ADL)
ADLS_ACUITY_SCORE: 30

## 2018-12-25 NOTE — PLAN OF CARE
/54 (BP Location: Left arm)   Pulse 72   Temp 97.9  F (36.6  C) (Axillary)   Resp 18   SpO2 98%     Time: 6770-4774     Reason for admission: GIB   Vitals: VSS on room air, telemetry in place.NSR.  Activity: SBA, bed or chair alarm at all times.  Pain: No non-verbal indicators of pain  Neuro: alert and arousable to touch, blind, deaf and history of intellectual impairment. Does not call appropriately.   Cardiac: Pt started Metoprolol. HR 60-70s/.  Respiratory: WDL  GI/: Pt up frequently to bathroom. Voiding spontaneously. Small dark brown stool x 1. Hgb 12.   Diet: Tolerating regular diet, good appetite. Will take all PO offered. Set-up assist with supervision.   Lines: PIV fell out. Not replaced due to probable discharge today.   Skin/Wounds: WDL   New changes this shift: No change, Possible discharge today.

## 2018-12-25 NOTE — DISCHARGE SUMMARY
[facility], [place]  Discharge Summary - Medicine & Pediatrics       Date of Admission:  12/22/2018  Date of Discharge:  12/25/2018  Discharging Provider: Debbie Thapa  Discharge Service: Bang Killian    Discharge Diagnoses   1. Erosive esophagitis with ulceration in distal esophagus  2. Atrial fibrillation with rapid ventricular rate       Follow-ups Needed After Discharge   Follow-up Appointments     Follow Up and recommended labs and tests      Follow up with primary care provider within 7 days to evaluate medication change.  The following labs/tests are recommended: recommend repeat CBC, BMP.             Additional important follow-up instructions/to-do's for PCP:  1. Please check out the skin lesion on the left side of his back. It should be biopsied.       Unresulted Labs Ordered in the Past 30 Days of this Admission     Date and Time Order Name Status Description    12/22/2018 1230 Surgical pathology exam In process       These results will be followed up by GI     1. He will need to be seen by GI for follow up of biopsy results.     Hospital Course   Agapito Mullen is a 73 year old male with known blindness and deafness but no known history of GI disease who presents with one day of recurrent coffee ground emesis and dark black diarrhea.     # UGI Bleed  New onset bleeding without known history of ulcerative or liver disease, normal coags and platelets, no sign of other mucosal bleeding.  BUN without normal limits, no obvious sign of abdominal pain.  Hgb appropriate at outside facility but would be valuable to follow-up if to determine if bleeding is persistent. EGD showed non-bleeding esophageal ulcers and esophagitis, as well as gastric mucosal atrophy. Hgb stable at 12 on day of discharge. No notes about bloody or dark stool.   - PO pantoprazole 2x/day for3 months  - Avoid NSAIDs  - Biopsy results pending; Patient will need follow up with GI to discuss biopsy results and need for  re-scoping.      # A fib with RVR-- resolved  Patient went into a fib with RVR while getting EGD. HR in the 140s, BPs in the 90s/50s. Received IV metoprolol 5 mg x1, then 12.5 mg of metop tartrate with good resolution to HR in the 70s. He had another episode of a fib with RVR overnight with resolution after 12.5 mg of metoprolol tartrate was given. [id number] of 1 (due to age) and HAS BLED of 1 (due to age). Risk of bleeding is 3.4% compared to 1.3% risk of stroke. Will not anticoagulate   - Continue PO metoprolol tartrate 12.5 mg BID   - He will need to follow up with his primary care provider about long term use of metoprolol for a fib     # Skin lesion on left upper back  Unable to know if this skin lesion is cancerous or benign. PCP should follow up about this. May need to be seen by dermatology.     Consultations This Hospital Stay   GI LUMINAL ADULT IP CONSULT  VASCULAR ACCESS CARE ADULT IP CONSULT  ADVANCE DIRECTIVE IP CONSULT    Code Status   Full Code       The patient was discussed with MD Bang Alaniz 1 Service  [facility], [place]  Pager: [phone]  ______________________________________________________________________    Physical Exam   Vital Signs: Temp: 97.9  F (36.6  C) Temp src: Axillary BP: 131/54   Heart Rate: 72 Resp: 18 SpO2: 98 % O2 Device: Nasal cannula    Weight: 0 lbs 0 oz  Physical Exam   Constitutional: No distress.   Eyes:   Blind, eyes closed    Cardiovascular: Normal rate, regular rhythm and normal heart sounds.   Pulmonary/Chest: Effort normal and breath sounds normal. No stridor. No respiratory distress.   Abdominal: Soft. Bowel sounds are normal. He exhibits no distension.   Musculoskeletal: He exhibits no edema or deformity.   Skin: Skin is warm and dry. No rash noted. He is not diaphoretic. No erythema.   There is a brown skin lesion on his left upper back           Primary Care Physician   No primary care provider on  file.    Discharge Disposition   Discharged to home  Condition at discharge: Stable    Significant Results and Procedures   No results found for this or any previous visit.    Discharge Orders      Reason for your hospital stay    Admitted with concern for upper GI bleeding, had an EGD completed that did not see a source of bleeding. Hemoglobin stable on recheck here. Recommendation is for pantoprazole 40mg by mouth twice a day for three months and then this medication can stop. Also had occurrence of atrial fibrillation with rapid heart rates in the hospital, this has resolved and a new medication (metoprolol tartrate 12.5mg by mouth twice a day) for heart rate control for new diagnosis paroxysmal atrial fibrillation.     Follow Up and recommended labs and tests    Follow up with primary care provider within 7 days to evaluate medication change.  The following labs/tests are recommended: recommend repeat CBC, BMP.     Discharge Instructions    New medications are:   1. Pantoprazole 40mg by mouth twice a day for 3 months to help GI tract heal after GI bleed.  2. Metoprolol tartrate 12.5mg by mouth twice a day for heart rate control with new diagnosis atrial fibrillation.    Recommend patient be seen by his primary care provider in about a week for hospital follow up, recommend a lab check with a CBC and BMP.     Discharge Medications   Current Discharge Medication List      START taking these medications    Details   metoprolol tartrate (LOPRESSOR) 25 MG tablet Take 0.5 tablets (12.5 mg) by mouth 2 times daily  Qty: 30 tablet, Refills: 0    Comments: Indication: paroxysmal atrial fibrillation  Associated Diagnoses: Paroxysmal atrial fibrillation (H)      pantoprazole (PROTONIX) 40 MG EC tablet Take 1 tablet (40 mg) by mouth 2 times daily (before meals)  Qty: 60 tablet, Refills: 0    Comments: Indication: GI bleed  Recommendation is for 3 months total of treatment with pantoprazole.  Associated Diagnoses:  Hematemesis, presence of nausea not specified           Allergies   No Known Allergies

## 2018-12-25 NOTE — PLAN OF CARE
Pt is blind and deaf. Up with SBA. Bed alarm is on. Ate a snack of cheerios and milk but didn't touch supper. Up to bathroom to void. Incontinent of stool x 1 which was small, soft and brown. No indications of pain. Pt removed his PIV and removed his ID band.

## 2018-12-25 NOTE — PLAN OF CARE
/60 (BP Location: Left leg)   Pulse 72   Temp 97.4  F (36.3  C) (Axillary)   Resp 18   Wt 75.6 kg (166 lb 11.2 oz)   SpO2 99%     Time: 3772-1972     Reason for admission: GIB   Vitals: VSS on RA ex elevated HR intermittently  Activity: SBA  Pain: No non-verbal indicators of pain  Neuro: alert and arousable to touch, blind, deaf and history intellectual impairment. Does not call appropriately, alarms active and audible. OLIVIA other neuros but seems consistent with baseline per  staff. Will yell/scream when has needs (food, toilet, up in chair)  Cardiac: Afib with intermittent RVR with activity (not sustaining)  Respiratory: WDL  GI/: WDL, toileting offered every 2-3 hours  Diet: Regular with good appetite  Lines: None  Skin/Wounds: WDL  Labs: Hgb=12    New changes this shift: Medically stable for discharge. Tele removed. Dressed back in own clothes and belongings returned.      Plan: AVS reviewed and acknowledged by pt's nephew. Sent with hard copy of discharge packet to be given to Skyler Moss and copy faxed there as well by this writer. On Call  staff Sheila did not want report. Tracy Rajiv Guardian updated. Left unit to Discharge Pharmacy to  Metoprolol and Protonix with Nephew in a wheelchair to family's care. Left unit at 1245

## 2018-12-25 NOTE — DISCHARGE INSTRUCTIONS
1. Please no NSAIDs  2. Pantoprazole 2x/day for 3 months   3. Metoprolol 12.5 2x/day  4. Follow up with primary care doctor within 1 week about atrial fibrilllation, new medications, and the spot on his back.  5. May also want to see a dermatologist for the spot on the back, but can defer to primary care doctor.

## 2018-12-25 NOTE — PROGRESS NOTES
Care Coordinator - Discharge Planning    Admission Date/Time:  12/22/2018  Attending MD:  Raquel Hwang MD     Data  Date of initial CC assessment:  12/25/2018  Chart reviewed, discussed with interdisciplinary team.   Patient was admitted for:   1. Paroxysmal atrial fibrillation (H)    2. Hematemesis, presence of nausea not specified         Assessment   Full assessment completed in previous note    Coordination of Care and Referrals: SW notified by MD that patient is stable for discharge. Writer contacted patient's group home, [facility] (on call [phone]). Per RN, patient's nieceAysha (phn [phone]) will  patient from hospital. He will spend the evening with family and the group home will pick him up tomorrow. Writer spoke with Aysha, she confirmed she will  patient from the hospital at 11:30 am. Writer updated bedside RN and MD. Writer also updated patient's emergency contact/guardian Tracy Vance, [phone].      Plan  Anticipated Discharge Date:  12/25/2018  Anticipated Discharge Plan:  Return to group home with clinic follow up as recommended by team    CTS Handoff completed:  NO    Ana Laura Julio RNCC

## 2018-12-26 NOTE — PROGRESS NOTES
Patient was discharged to his previous living arrangement - group home, [facility]. No post-hospital call needed at this time.

## 2018-12-31 LAB — COPATH REPORT: NORMAL

## 2019-01-16 ENCOUNTER — TELEPHONE (OUTPATIENT)
Dept: GASTROENTEROLOGY | Facility: CLINIC | Age: 74
End: 2019-01-16

## 2019-01-16 DIAGNOSIS — K29.40 ATROPHIC GASTRITIS WITHOUT HEMORRHAGE: Primary | ICD-10-CM

## 2019-01-16 NOTE — TELEPHONE ENCOUNTER
Patient was called to notify him about the results of his endoscopic biopsies. The [facility] which takes care of Mr. Mullen was called and the nursing staff Olinda was notified about the results.  His gastric biopsies showed complete intestinal metaplasia and atrophic gastritis.    He will need a repeat EGD in 3 years for mapping and he will need work-up for autoimmune gastritis with anti-parietal Ab and intrinsic factor Ab. His nursing staff will notify [facility] who will draw labs. The results of the tests will be faxed to Dr. Jessica Pak's office ([phone]). An order for repeat EGD in 3 years has also been placed.      Lui Rubio  January 16, 2019  P [phone]

## 2019-01-22 ENCOUNTER — TELEPHONE (OUTPATIENT)
Dept: GASTROENTEROLOGY | Facility: CLINIC | Age: 74
End: 2019-01-22

## 2019-01-22 NOTE — TELEPHONE ENCOUNTER
Unable to send patient information to outside facility since there is no release of information on file.

## 2019-01-22 NOTE — TELEPHONE ENCOUNTER
[facility] Call Center    Phone Message    May a detailed message be left on voicemail: yes    Reason for Call: Other: Olinda from [facility] states that the Pt will not be coming to the [facility] GI clinic to see Dr Pak, he will be seeing someone locally in that area. it is noted in the chart that she was faxing info . but she needs the results of the EGD and other related info. faxed to her as soon as possible at [phone] attn. Olinda     Action Taken: Message routed to:  [facility] ([facility]): Please call her wiht questions

## (undated) RX ORDER — SIMETHICONE 20 MG/.3ML
EMULSION ORAL
Status: DISPENSED
Start: 2018-12-22

## (undated) RX ORDER — FENTANYL CITRATE 50 UG/ML
INJECTION, SOLUTION INTRAMUSCULAR; INTRAVENOUS
Status: DISPENSED
Start: 2018-12-22